# Patient Record
Sex: MALE | Race: WHITE | NOT HISPANIC OR LATINO | Employment: FULL TIME | URBAN - METROPOLITAN AREA
[De-identification: names, ages, dates, MRNs, and addresses within clinical notes are randomized per-mention and may not be internally consistent; named-entity substitution may affect disease eponyms.]

---

## 2019-04-15 ENCOUNTER — HOSPITAL ENCOUNTER (OUTPATIENT)
Dept: ULTRASOUND IMAGING | Facility: HOSPITAL | Age: 54
Discharge: HOME | End: 2019-04-15
Admitting: FAMILY MEDICINE

## 2019-04-15 DIAGNOSIS — R18.8 OTHER ASCITES: ICD-10-CM

## 2019-04-15 DIAGNOSIS — R10.12 LUQ PAIN: ICD-10-CM

## 2019-04-15 PROCEDURE — 76700 US EXAM ABDOM COMPLETE: CPT

## 2021-07-06 ENCOUNTER — OFFICE VISIT (OUTPATIENT)
Dept: PRIMARY CARE CLINIC | Age: 56
End: 2021-07-06
Payer: COMMERCIAL

## 2021-07-06 VITALS
HEIGHT: 71 IN | TEMPERATURE: 98.4 F | OXYGEN SATURATION: 97 % | DIASTOLIC BLOOD PRESSURE: 99 MMHG | WEIGHT: 220 LBS | BODY MASS INDEX: 30.8 KG/M2 | HEART RATE: 102 BPM | SYSTOLIC BLOOD PRESSURE: 147 MMHG

## 2021-07-06 DIAGNOSIS — H11.32 SUBCONJUNCTIVAL HEMORRHAGE OF LEFT EYE: Primary | ICD-10-CM

## 2021-07-06 PROCEDURE — 99202 OFFICE O/P NEW SF 15 MIN: CPT | Performed by: NURSE PRACTITIONER

## 2021-07-06 RX ORDER — ERYTHROMYCIN 5 MG/G
OINTMENT OPHTHALMIC
Qty: 3.5 G | Refills: 0 | Status: SHIPPED | OUTPATIENT
Start: 2021-07-06 | End: 2021-07-16

## 2021-07-06 ASSESSMENT — PATIENT HEALTH QUESTIONNAIRE - PHQ9: DEPRESSION UNABLE TO ASSESS: URGENT/EMERGENT SITUATION

## 2021-07-06 NOTE — PROGRESS NOTES
SUBJECTIVE:    Patient ID: Tracy Shirley is a 48 y.o.male. Chief Complaint   Patient presents with    Eye Problem         HPI:    Pt c/o Left eye redness noticed about 1 week ago. Happened in past but resolved in few days. Patient occupation is a  . OTC Visine not helpful. He is current smoker smoker. Coughing a lot few weeks due to allergies and thinks strained and caused eye redness. States happens about 2 times a year. No vision changes or pain. No injury or foreign object sensation. Denies headache, discharge. Has not been to eye doctor in several years and does not have a primary care provider. Reports no home meds. No other treatment. Patient's medications, allergies, past medical, surgical, social and family histories were reviewed and updated as appropriate in electronic medical record. No outpatient medications have been marked as taking for the 7/6/21 encounter (Office Visit) with MADELYN Walton - CNP. Review of Systems   Constitutional: Negative for chills, diaphoresis and fever. HENT: Negative. Eyes: Positive for redness. Negative for photophobia, pain, discharge, itching and visual disturbance. See HPI   Respiratory: Positive for cough. Negative for shortness of breath, wheezing and stridor. Cardiovascular: Negative. Gastrointestinal: Negative for diarrhea, nausea and vomiting. Musculoskeletal: Negative. Neurological: Negative. History reviewed. No pertinent past medical history. History reviewed. No pertinent surgical history. History reviewed. No pertinent family history.    Social History     Tobacco Use   Smoking Status Not on file       OBJECTIVE:   Wt Readings from Last 3 Encounters:   07/06/21 220 lb (99.8 kg)     BP Readings from Last 3 Encounters:   07/06/21 (!) 147/99       BP (!) 147/99   Pulse 102   Temp 98.4 °F (36.9 °C)   Ht 5' 11\" (1.803 m)   Wt 220 lb (99.8 kg)   SpO2 97%   BMI 30.68 kg/m²      Physical Exam  Vitals and nursing note reviewed. Constitutional:       General: He is not in acute distress. Appearance: Normal appearance. HENT:      Head: Normocephalic. Eyes:      General: Lids are normal. Lids are everted, no foreign bodies appreciated. No scleral icterus. Right eye: No foreign body. Left eye: No foreign body. Extraocular Movements: Extraocular movements intact. Conjunctiva/sclera:      Right eye: Right conjunctiva is not injected. No exudate or hemorrhage. Left eye: Left conjunctiva is injected. Hemorrhage present. No exudate. Pupils: Pupils are equal, round, and reactive to light. Comments: Left eye with moderate erythema conjunctiva injected. Tiny blood clot appearing by pupil. Deferring on to local eye care center immediately. Cardiovascular:      Rate and Rhythm: Normal rate. Pulmonary:      Effort: Pulmonary effort is normal.   Abdominal:      Tenderness: There is no guarding. Musculoskeletal:      Cervical back: Normal range of motion. Neurological:      Mental Status: He is alert and oriented to person, place, and time. Psychiatric:         Mood and Affect: Mood normal.         Thought Content: Thought content normal.         No results found for: NA, K, CL, CO2, GLUCOSE, BUN, CREATININE, CALCIUM, PROT, LABALBU, BILITOT, ALT, AST    No results found for: LABA1C, LABMICR, LDLCALC      No results found for: WBC, NEUTROABS, HGB, HCT, MCV, PLT    No results found for: TSH      ASSESSMENT/PLAN:     1. Subconjunctival hemorrhage of left eye  Defer on to ophthalmology for further evaluation and care. Discussed with clinic referrals and got patient appointment at local eye care center at this time. Patient agreeable to plans of care and wife present and will take him onto eye doctor now. Symptom management and start ointment as directed; further plans per ophthalmology.       - External Referral To Ophthalmology  - erythromycin (ROMYCIN) 5 MG/GM ophthalmic ointment;  Apply thin layer to affected eye twice daily 5 days  Dispense: 3.5 g; Refill: 0        Orders Placed This Encounter   Medications    erythromycin (ROMYCIN) 5 MG/GM ophthalmic ointment     Sig: Apply thin layer to affected eye twice daily 5 days     Dispense:  3.5 g     Refill:  0

## 2021-07-13 ASSESSMENT — ENCOUNTER SYMPTOMS
COUGH: 1
VOMITING: 0
SHORTNESS OF BREATH: 0
EYE PAIN: 0
DIARRHEA: 0
NAUSEA: 0
WHEEZING: 0
PHOTOPHOBIA: 0
STRIDOR: 0
EYE REDNESS: 1
EYE DISCHARGE: 0
EYE ITCHING: 0

## 2021-11-07 ENCOUNTER — HOSPITAL ENCOUNTER (EMERGENCY)
Facility: HOSPITAL | Age: 56
Discharge: HOME | End: 2021-11-07
Attending: EMERGENCY MEDICINE
Payer: COMMERCIAL

## 2021-11-07 VITALS
TEMPERATURE: 98.1 F | WEIGHT: 220 LBS | HEART RATE: 99 BPM | BODY MASS INDEX: 30.8 KG/M2 | DIASTOLIC BLOOD PRESSURE: 97 MMHG | RESPIRATION RATE: 20 BRPM | OXYGEN SATURATION: 96 % | HEIGHT: 71 IN | SYSTOLIC BLOOD PRESSURE: 134 MMHG

## 2021-11-07 DIAGNOSIS — J02.9 VIRAL PHARYNGITIS: Primary | ICD-10-CM

## 2021-11-07 LAB — S PYO AG THROAT QL: NEGATIVE

## 2021-11-07 PROCEDURE — 87880 STREP A ASSAY W/OPTIC: CPT

## 2021-11-07 PROCEDURE — 6360000002 HC RX W HCPCS: Performed by: EMERGENCY MEDICINE

## 2021-11-07 PROCEDURE — 99283 EMERGENCY DEPT VISIT LOW MDM: CPT

## 2021-11-07 RX ORDER — DEXAMETHASONE SODIUM PHOSPHATE 10 MG/ML
6 INJECTION, SOLUTION INTRAMUSCULAR; INTRAVENOUS ONCE
Status: COMPLETED | OUTPATIENT
Start: 2021-11-07 | End: 2021-11-07

## 2021-11-07 RX ADMIN — DEXAMETHASONE SODIUM PHOSPHATE 6 MG: 10 INJECTION, SOLUTION INTRAMUSCULAR; INTRAVENOUS at 10:33

## 2021-11-07 ASSESSMENT — PAIN DESCRIPTION - PAIN TYPE: TYPE: ACUTE PAIN

## 2021-11-07 ASSESSMENT — PAIN DESCRIPTION - DESCRIPTORS: DESCRIPTORS: SORE

## 2021-11-07 ASSESSMENT — PAIN SCALES - GENERAL: PAINLEVEL_OUTOF10: 6

## 2021-11-07 ASSESSMENT — PAIN DESCRIPTION - LOCATION: LOCATION: THROAT

## 2021-11-07 NOTE — ED NOTES
Patient states that he felt like he had a sore throat and he felt of his throat and it was sore, he noticed this yesterday. Patient with c/o difficulty swallowing since Friday.      Hugo Santana RN  11/07/21 1019

## 2021-11-07 NOTE — ED PROVIDER NOTES
Triage Chief Complaint:   Pharyngitis      [place]:  Luciana Horvath is a 55 y.o. male that presents to emergency department with sore throat ulcer and for lump on his throat. He states he started having a sore throat 2 days ago. He states he has some pain with swallowing. He denies cough, chest pain, shortness of breath, fever or chills. He noticed a lump to the front part of his throat yesterday and that is what prompted him to come to the emergency department. No neck pain or stiffness or difficulty moving his neck. No other complaints. History reviewed. No pertinent past medical history. History reviewed. No pertinent surgical history. History reviewed. No pertinent family history. Social History     Socioeconomic History    Marital status:      Spouse name: Not on file    Number of children: Not on file    Years of education: Not on file    Highest education level: Not on file   Occupational History    Not on file   Tobacco Use    Smoking status: Current Every Day Smoker     Packs/day: 1.00     Years: 30.00     Pack years: 30.00     Types: Cigarettes    Smokeless tobacco: Never Used   Substance and Sexual Activity    Alcohol use: Yes     Comment: 1 case of beer per weekend every 6 weeks    Drug use: Never    Sexual activity: Not on file   Other Topics Concern    Not on file   Social History Narrative    Not on file     Social Determinants of Health     Financial Resource Strain:     Difficulty of Paying Living Expenses: Not on file   Food Insecurity:     Worried About Running Out of Food in the Last Year: Not on file    Ryan of Food in the Last Year: Not on file   Transportation Needs:     Lack of Transportation (Medical): Not on file    Lack of Transportation (Non-Medical):  Not on file   Physical Activity:     Days of Exercise per Week: Not on file    Minutes of Exercise per Session: Not on file   Stress:     Feeling of Stress : Not on file   Social Connections:     Frequency of Communication with Friends and Family: Not on file    Frequency of Social Gatherings with Friends and Family: Not on file    Attends Worship Services: Not on file    Active Member of Clubs or Organizations: Not on file    Attends Club or Organization Meetings: Not on file    Marital Status: Not on file   Intimate Partner Violence:     Fear of Current or Ex-Partner: Not on file    Emotionally Abused: Not on file    Physically Abused: Not on file    Sexually Abused: Not on file   Housing Stability:     Unable to Pay for Housing in the Last Year: Not on file    Number of [place] in the Last Year: Not on file    Unstable Housing in the Last Year: Not on file     No current facility-administered medications for this encounter. No current outpatient medications on file. No Known Allergies  Nursing Notes Reviewed    ROS:  At least 10 systems reviewed and otherwise negative except as in the [address] Physical Exam:  ED Triage Vitals [11/07/21 1011]   Enc Vitals Group      BP (!) 152/104      Pulse 99      Resp 20      Temp 98.1 °F (36.7 °C)      Temp Source Oral      SpO2 97 %      Weight 220 lb (99.8 kg)      Height 5' 11\" (1.803 m)      Head Circumference       Peak Flow       Pain Score       Pain Loc       Pain Edu? Excl. in [address]? My pulse oximetry interpretation is which is within the normal range    GENERAL APPEARANCE: Awake and alert. Cooperative. No acute distress. HEAD:  Atraumatic. EYES: EOM's grossly intact. ENT: Mucous membranes are moist.  No trismus. Oropharynx with slight erythema. Uvula midline. No tonsillar swelling or exudate or abscess. Bilateral anterior cervical lymphadenopathy noted. NECK:  Trachea midline. HEART: RRR. Radial pulses 2+. LUNGS: Respirations unlabored. CTAB  EXTREMITIES: No acute deformities. SKIN: Warm and dry. NEUROLOGICAL: No gross facial drooping. Moves all 4 extremities spontaneously. PSYCHIATRIC: Normal mood.     I have reviewed and interpreted all of the currently available lab results from this visit (if applicable):  Results for orders placed or performed during the hospital encounter of 11/07/21   Strep Screen Group A Throat    Specimen: Throat   Result Value Ref Range    Rapid Strep A Screen Negative Negative          EKG: (All EKG's are interpreted by myself in the absence of a cardiologist)      MDM:  I did give the patient oral Decadron and swab him for strep throat. Strep screen is negative. On reevaluation patient states his throat already feels much better. It is likely that what I am feeling over the anterior neck is a lymph node however I explained to the patient that if this continues after his sore throat resolves that he needs further follow-up for evaluation    Clinical Impression:  1. Viral pharyngitis        Disposition Vitals:  [unfilled], [unfilled], [unfilled], [unfilled]    Disposition referral (if applicable):  Generic High  NO FAMILY DOC ONLY    Schedule an appointment as soon as possible for a visit   If symptoms worsen      Disposition medications (if applicable): There are no discharge medications for this patient.         (Please note that portions of this note may have been completed with a voice recognition program. Efforts were made to edit the dictations but occasionally words are mis-transcribed.)    MD Simone Fields MD  11/07/21 2257